# Patient Record
Sex: MALE | Race: WHITE
[De-identification: names, ages, dates, MRNs, and addresses within clinical notes are randomized per-mention and may not be internally consistent; named-entity substitution may affect disease eponyms.]

---

## 2020-09-08 ENCOUNTER — HOSPITAL ENCOUNTER (OUTPATIENT)
Dept: HOSPITAL 62 - SC | Age: 60
Discharge: HOME | End: 2020-09-08
Attending: OPHTHALMOLOGY
Payer: COMMERCIAL

## 2020-09-08 DIAGNOSIS — Z79.82: ICD-10-CM

## 2020-09-08 DIAGNOSIS — Z79.899: ICD-10-CM

## 2020-09-08 DIAGNOSIS — Z95.2: ICD-10-CM

## 2020-09-08 DIAGNOSIS — E78.00: ICD-10-CM

## 2020-09-08 DIAGNOSIS — I50.9: ICD-10-CM

## 2020-09-08 DIAGNOSIS — I11.0: ICD-10-CM

## 2020-09-08 DIAGNOSIS — E78.5: ICD-10-CM

## 2020-09-08 DIAGNOSIS — Z70.1: ICD-10-CM

## 2020-09-08 DIAGNOSIS — H25.813: Primary | ICD-10-CM

## 2020-09-08 PROCEDURE — V2632 POST CHMBR INTRAOCULAR LENS: HCPCS

## 2020-09-08 PROCEDURE — 66984 XCAPSL CTRC RMVL W/O ECP: CPT

## 2020-09-08 RX ADMIN — Medication ONE ML: at 10:06

## 2020-09-08 RX ADMIN — EPINEPHRINE ONE MG: 1 INJECTION, SOLUTION, CONCENTRATE INTRAVENOUS at 10:06

## 2020-09-08 RX ADMIN — CYCLOPENTOLATE HYDROCHLORIDE AND PHENYLEPHRINE HYDROCHLORIDE PRN DROP: 2; 10 SOLUTION/ DROPS OPHTHALMIC at 09:03

## 2020-09-08 RX ADMIN — DORZOLAMIDE HYDROCHLORIDE AND TIMOLOL MALEATE PRN DROP: 20; 5 SOLUTION OPHTHALMIC at 00:00

## 2020-09-08 RX ADMIN — TETRACAINE HYDROCHLORIDE PRN DROP: 5 SOLUTION OPHTHALMIC at 09:32

## 2020-09-08 RX ADMIN — TROPICAMIDE PRN DROP: 10 SOLUTION/ DROPS OPHTHALMIC at 09:03

## 2020-09-08 RX ADMIN — BESIFLOXACIN PRN DROP: 6 SUSPENSION OPHTHALMIC at 11:31

## 2020-09-08 RX ADMIN — TETRACAINE HYDROCHLORIDE PRN DROP: 5 SOLUTION OPHTHALMIC at 09:03

## 2020-09-08 RX ADMIN — Medication ONE ML: at 00:00

## 2020-09-08 RX ADMIN — TETRACAINE HYDROCHLORIDE PRN DROP: 5 SOLUTION OPHTHALMIC at 09:54

## 2020-09-08 RX ADMIN — BESIFLOXACIN PRN DROP: 6 SUSPENSION OPHTHALMIC at 09:03

## 2020-09-08 RX ADMIN — TOBRAMYCIN AND DEXAMETHASONE ONE APPLIC: 3; 1 OINTMENT OPHTHALMIC at 11:31

## 2020-09-08 RX ADMIN — TOBRAMYCIN AND DEXAMETHASONE ONE APPLIC: 3; 1 OINTMENT OPHTHALMIC at 00:00

## 2020-09-08 RX ADMIN — BESIFLOXACIN PRN DROP: 6 SUSPENSION OPHTHALMIC at 00:00

## 2020-09-08 RX ADMIN — EPINEPHRINE ONE MG: 1 INJECTION, SOLUTION, CONCENTRATE INTRAVENOUS at 00:00

## 2020-09-08 RX ADMIN — BESIFLOXACIN PRN DROP: 6 SUSPENSION OPHTHALMIC at 09:13

## 2020-09-08 RX ADMIN — SODIUM CHONDROITIN SULFATE / SODIUM HYALURONATE ONE EACH: 0.55-0.5 INJECTION INTRAOCULAR at 10:06

## 2020-09-08 RX ADMIN — SODIUM CHONDROITIN SULFATE / SODIUM HYALURONATE ONE EACH: 0.55-0.5 INJECTION INTRAOCULAR at 00:00

## 2020-09-08 RX ADMIN — CYCLOPENTOLATE HYDROCHLORIDE AND PHENYLEPHRINE HYDROCHLORIDE PRN DROP: 2; 10 SOLUTION/ DROPS OPHTHALMIC at 09:23

## 2020-09-08 RX ADMIN — TROPICAMIDE PRN DROP: 10 SOLUTION/ DROPS OPHTHALMIC at 09:13

## 2020-09-08 RX ADMIN — CYCLOPENTOLATE HYDROCHLORIDE AND PHENYLEPHRINE HYDROCHLORIDE PRN DROP: 2; 10 SOLUTION/ DROPS OPHTHALMIC at 09:13

## 2020-09-08 RX ADMIN — TROPICAMIDE PRN DROP: 10 SOLUTION/ DROPS OPHTHALMIC at 09:23

## 2020-09-08 RX ADMIN — DORZOLAMIDE HYDROCHLORIDE AND TIMOLOL MALEATE PRN DROP: 20; 5 SOLUTION OPHTHALMIC at 11:31

## 2020-09-09 NOTE — OPERATIVE REPORT
Operative Report[facility]


Operative Report: 





PREOPERATIVE DIAGNOSIS: Nuclear, cortical and posterior subcapsular cataract, 

right eye


POSTOPERATIVE DIAGNOSIS: Nuclear, cortical and posterior subcapsular cataracts, 

right eye


PROCEDURE: Phacoemulsification and posterior chamber intraocular lens implant 

and anterior vitrectomy, right eye


PROCEDURE DATE: [September 8, 2020]


SURGEON: Emil Genao MD Next


ANESTHETIST: [Edel]


ANESTHESIA: Topical with IV sedation next


COMPLICATIONS: Posterior capsular tear


TISSUE TO PATHOLOGY: None


ESTIMATED BLOOD LOSS: None


INDICATION FOR SURGERY: [Mr. Carroll is a 60 year old male] Who presents to our

clinic complaining of difficulty seeing, to read and drive due to blurry vision 

in both eyes. On examination, she was found to have best corrected visual acuity

of [20/50] in the right eye. Ophthalmoscopy revealed a [+2] nuclear, [+3] 

corneal degeneration, [+3] posterior subcapsular cataract in the right eye with 

normal appearing cornea, vitreous, retina and optic nerve. I discussed the 

findings of the exam with the patient. We discussed the risks, benefits and 

alternatives of cataract extraction and intraocular lens implant in the right 

eye as a means of improving her vision. Risks that were discussed with the 

patient include infection, bleeding, retinal detachment and possible need for 

additional surgery. The patient understands that she may need to wear glasses 

after surgery. After discussion, the patient indicated her interest in having 

this procedure performed by signing an informed witness consent form.


REPORT OF PROCEDURE: On the day of surgery, the patient was given a topical 

application to the right eye to consist of drop of Tetracaine 0.5%, tropicamide 

1%, Cyclomidril, Besivance 0.6% and Acular 0.45%. The patient was then taken to 

the operating room in a supine position in a standard eye bed. Intravenous 

sedation was administered and she was prepped and draped in the standard 

fashion. A timeout was performed to confirm the surgical site. Attention was 

directed to the right eye where a paracentesis was created at the 11:30 position

 at the corneal limbus with a 15 degree blade. The anterior chamber was filled 

with 0.3 mL of 1% methylparaben free lidocaine and after 30 seconds the anterior

 chamber was filled with viscoelastic material. A 3 plane corneal incision was 

then made at the 9 o'clock position at the cornea limbus with a keratome. A 

continuous curvilinear capsulorrhexis was then made in the anterior capsule of 

the lens with a cystotome. The lens was hydrodissected using balanced saline 

solution. The lens nucleus was then removed by phacoemulsification using the 

stop and chop technique. CDE [14.32]. The remaining cortical material was then 

removed from the posterior capsular bag using irrigation and aspiration. During 

removal of the cortical material, a defect was detected in the posterior 

capsular bag and vitreous had prolapsed in the anterior segment. An anterior 

vitrectomy was performed and the corneal wound was checked with Weck-sebastian sponges

 and no vitreous was at the wound or in the anterior chamber. The posterior 

capsule bag and ciliary sulcus was filled with viscoelastic material and a lens 

implant was inserted with the haptics positioned in the ciliary sulcus and the 

lens optic positioned behind the capsular  leaflet in the plane of the posterior

 capsule bag. I have chosen for this case is a one piece acrylic lens from [employer]

 [employer] model [MA60AC], serial number [[id number]], lens power [20.0]. 

The lens was removed from its package, inspected and found to be free of defects

 it was loaded into a [place] B . The  was passed through the 

temporal wound and the lens was advanced into the posterior capsular bag. The 

lens implant was centered in the posterior capsular bag with the Nellie 

spatula the viscoelastic material was removed from the eye using irrigation and 

aspiration. The wounds were closed by stromal hydration and they were tested 

with the Weck-Sebastian sponges and found to have no leaks. Intraocular pressure was 

assessed by manual palpitation found to be with in the physiologic range. The 

drape and speculum were removed. Drops of Durezol, Combigan and gatifloxacin 

were instilled in the right eye. The patient was then taken to the recovery room

 in good condition. The patient tolerated the procedure very well. The patient 

was given a prescription for gatifloxacin, Durezol and Ilervo to use every 2 

hours while awake today. She will return my clinic tomorrow for follow-up 

evaluation.